# Patient Record
(demographics unavailable — no encounter records)

---

## 2018-04-07 NOTE — DIAGNOSTIC IMAGING REPORT
Exam:  Lumbar spine AP lateral obliques



History:  Pain



Comparison: None.



Findings: Transitional anatomy of S1 with hypoplastic disc space.



Bilateral pars defect of L5 with grade 1 anterolisthesis. Disc spaces

maintained.



Impression:



Bilateral pars interarticularis defect L5 with grade 1 anterolisthesis.





Signed by: Dr. Andrew Palisch, M.D. on 4/7/2018 5:08 PM